# Patient Record
Sex: MALE | Race: WHITE | Employment: UNEMPLOYED | URBAN - METROPOLITAN AREA
[De-identification: names, ages, dates, MRNs, and addresses within clinical notes are randomized per-mention and may not be internally consistent; named-entity substitution may affect disease eponyms.]

---

## 2017-05-31 ENCOUNTER — HOSPITAL ENCOUNTER (EMERGENCY)
Age: 72
Discharge: HOME | End: 2017-05-31
Attending: EMERGENCY MEDICINE
Payer: MEDICARE

## 2017-05-31 VITALS
RESPIRATION RATE: 18 BRPM | SYSTOLIC BLOOD PRESSURE: 140 MMHG | BODY MASS INDEX: 38.77 KG/M2 | TEMPERATURE: 98.1 F | OXYGEN SATURATION: 98 % | DIASTOLIC BLOOD PRESSURE: 82 MMHG | HEART RATE: 77 BPM | WEIGHT: 247 LBS | HEIGHT: 67 IN

## 2017-05-31 DIAGNOSIS — B35.1 ONYCHOMYCOSIS: ICD-10-CM

## 2017-05-31 DIAGNOSIS — M10.9 ACUTE GOUT INVOLVING TOE OF LEFT FOOT, UNSPECIFIED CAUSE: Primary | ICD-10-CM

## 2017-05-31 PROCEDURE — 99283 EMERGENCY DEPT VISIT LOW MDM: CPT | Performed by: EMERGENCY MEDICINE

## 2017-05-31 RX ORDER — TERBINAFINE HYDROCHLORIDE 250 MG/1
250 TABLET ORAL DAILY
Qty: 30 TAB | Refills: 0 | Status: SHIPPED | OUTPATIENT
Start: 2017-05-31

## 2017-05-31 RX ORDER — LISINOPRIL 5 MG/1
TABLET ORAL
COMMUNITY
End: 2021-07-12

## 2017-05-31 RX ORDER — TRAMADOL HYDROCHLORIDE 50 MG/1
50 TABLET ORAL
Status: DISCONTINUED | OUTPATIENT
Start: 2017-05-31 | End: 2017-05-31 | Stop reason: HOSPADM

## 2017-05-31 RX ORDER — METOPROLOL TARTRATE 50 MG/1
TABLET ORAL
COMMUNITY

## 2017-05-31 RX ORDER — DICLOFENAC SODIUM 30 MG/G
GEL TOPICAL 2 TIMES DAILY
Qty: 100 G | Refills: 0 | Status: SHIPPED | OUTPATIENT
Start: 2017-05-31

## 2017-05-31 RX ORDER — COLCHICINE 0.6 MG/1
0.6 CAPSULE ORAL DAILY
Qty: 10 CAP | Refills: 0 | Status: SHIPPED | OUTPATIENT
Start: 2017-05-31

## 2017-06-01 NOTE — ED PROVIDER NOTES
HPI Comments: Patient presents referred from complaining of pain in the left great toe as well as a deformed toenail. He states he's had a similar  deformation of the toenail on the right foot and the toenail falls off from time to time. the pain in the left great toe is at the metatarsal phalangeal joint and was present when he awoke this morning he denies any history of trauma he denies any prior occurrence    Patient is a 67 y.o. male presenting with foot pain. The history is provided by the patient. Foot Pain    This is a new problem. The current episode started 12 to 24 hours ago. The problem occurs constantly. The problem has not changed since onset. The pain is present in the left foot. The quality of the pain is described as aching. The pain is at a severity of 9/10. The pain is severe. Pertinent negatives include no numbness, full range of motion, no stiffness and no tingling. The symptoms are aggravated by activity, standing and contact. He has tried nothing for the symptoms. The treatment provided no relief. There has been no history of extremity trauma. Past Medical History:   Diagnosis Date    Chronic obstructive pulmonary disease ([facility] [address]     Hypertension        History reviewed. No pertinent surgical history. History reviewed. No pertinent family history. Social History     Social History    Marital status: SINGLE     Spouse name: N/A    Number of children: N/A    Years of education: N/A     Occupational History    Not on file. Social History Main Topics    Smoking status: Current Every Day Smoker     Packs/day: 1.00    Smokeless tobacco: Not on file    Alcohol use Not on file    Drug use: Not on file    Sexual activity: Not on file     Other Topics Concern    Not on file     Social History Narrative    No narrative on file         ALLERGIES: Codeine and Pcn [penicillins]    Review of Systems   Musculoskeletal: Negative for stiffness.    Neurological: Negative for tingling and numbness. All other systems reviewed and are negative. Vitals:    05/31/17 1929   BP: 133/77   Pulse: 82   Resp: 20   Temp: 97.8 °F (36.6 °C)   SpO2: 96%   Weight: 112 kg (247 lb)   Height: 5' 7\" (1.702 m)            Physical Exam   Constitutional: He appears well-developed and well-nourished. No distress. HENT:   Head: Normocephalic and atraumatic. Eyes: Conjunctivae and EOM are normal. Pupils are equal, round, and reactive to light. Musculoskeletal: Normal range of motion. He exhibits tenderness. He exhibits no edema. Erythema and tenderness is noted to the metatarsophalangeal joint of the left foot of the great toe. There are also large deformed toenail with findings consistent with onychomycosis   Neurological: He is alert. Skin: Skin is warm and dry. There is erythema. Psychiatric: He has a normal mood and affect. His behavior is normal.   Nursing note and vitals reviewed.        MDM  Number of Diagnoses or Management Options  Acute gout involving toe of left foot, unspecified cause:   Onychomycosis:   Risk of Complications, Morbidity, and/or Mortality  Presenting problems: low  Diagnostic procedures: minimal  Management options: low    Patient Progress  Patient progress: stable    ED Course       Procedures

## 2017-06-01 NOTE — ED NOTES
Patient refused pain medication. States that he \"does not believe in it. \" Patient given ice pack to put on on affected toe. Patient holding bag in hand.

## 2021-07-12 ENCOUNTER — HOSPITAL ENCOUNTER (EMERGENCY)
Age: 76
Discharge: HOME | End: 2021-07-12
Attending: EMERGENCY MEDICINE
Payer: MEDICARE

## 2021-07-12 VITALS
RESPIRATION RATE: 17 BRPM | DIASTOLIC BLOOD PRESSURE: 80 MMHG | HEART RATE: 85 BPM | TEMPERATURE: 99 F | SYSTOLIC BLOOD PRESSURE: 182 MMHG | OXYGEN SATURATION: 94 %

## 2021-07-12 DIAGNOSIS — T78.3XXA ANGIOEDEMA, INITIAL ENCOUNTER: Primary | ICD-10-CM

## 2021-07-12 LAB
ABO + RH BLD: NORMAL
ALBUMIN SERPL-MCNC: 3.5 G/DL (ref 3.2–4.6)
ALBUMIN/GLOB SERPL: 0.9 {RATIO} (ref 1.2–3.5)
ALP SERPL-CCNC: 94 U/L (ref 50–136)
ALT SERPL-CCNC: 17 U/L (ref 12–65)
ANION GAP SERPL CALC-SCNC: 1 MMOL/L (ref 7–16)
AST SERPL-CCNC: 12 U/L (ref 15–37)
BASOPHILS # BLD: 0.1 K/UL (ref 0–0.2)
BASOPHILS NFR BLD: 1 % (ref 0–2)
BILIRUB SERPL-MCNC: 0.2 MG/DL (ref 0.2–1.1)
BLOOD GROUP ANTIBODIES SERPL: NORMAL
BUN SERPL-MCNC: 16 MG/DL (ref 8–23)
CALCIUM SERPL-MCNC: 9.4 MG/DL (ref 8.3–10.4)
CHLORIDE SERPL-SCNC: 109 MMOL/L (ref 98–107)
CO2 SERPL-SCNC: 31 MMOL/L (ref 21–32)
CREAT SERPL-MCNC: 1.04 MG/DL (ref 0.8–1.5)
DIFFERENTIAL METHOD BLD: ABNORMAL
EOSINOPHIL # BLD: 0.5 K/UL (ref 0–0.8)
EOSINOPHIL NFR BLD: 6 % (ref 0.5–7.8)
ERYTHROCYTE [DISTWIDTH] IN BLOOD BY AUTOMATED COUNT: 16.3 % (ref 11.9–14.6)
GLOBULIN SER CALC-MCNC: 4 G/DL (ref 2.3–3.5)
GLUCOSE SERPL-MCNC: 87 MG/DL (ref 65–100)
HCT VFR BLD AUTO: 46.6 % (ref 41.1–50.3)
HGB BLD-MCNC: 14.6 G/DL (ref 13.6–17.2)
IMM GRANULOCYTES # BLD AUTO: 0.1 K/UL (ref 0–0.5)
IMM GRANULOCYTES NFR BLD AUTO: 1 % (ref 0–5)
LYMPHOCYTES # BLD: 2.2 K/UL (ref 0.5–4.6)
LYMPHOCYTES NFR BLD: 29 % (ref 13–44)
MCH RBC QN AUTO: 28.3 PG (ref 26.1–32.9)
MCHC RBC AUTO-ENTMCNC: 31.3 G/DL (ref 31.4–35)
MCV RBC AUTO: 90.5 FL (ref 79.6–97.8)
MONOCYTES # BLD: 0.7 K/UL (ref 0.1–1.3)
MONOCYTES NFR BLD: 9 % (ref 4–12)
NEUTS SEG # BLD: 4.1 K/UL (ref 1.7–8.2)
NEUTS SEG NFR BLD: 53 % (ref 43–78)
NRBC # BLD: 0 K/UL (ref 0–0.2)
PLATELET # BLD AUTO: 252 K/UL (ref 150–450)
PMV BLD AUTO: 10.7 FL (ref 9.4–12.3)
POTASSIUM SERPL-SCNC: 4.3 MMOL/L (ref 3.5–5.1)
PROT SERPL-MCNC: 7.5 G/DL (ref 6.3–8.2)
RBC # BLD AUTO: 5.15 M/UL (ref 4.23–5.6)
SODIUM SERPL-SCNC: 141 MMOL/L (ref 136–145)
SPECIMEN EXP DATE BLD: NORMAL
WBC # BLD AUTO: 7.7 K/UL (ref 4.3–11.1)

## 2021-07-12 PROCEDURE — 85025 COMPLETE CBC W/AUTO DIFF WBC: CPT

## 2021-07-12 PROCEDURE — 96375 TX/PRO/DX INJ NEW DRUG ADDON: CPT

## 2021-07-12 PROCEDURE — 74011250636 HC RX REV CODE- 250/636: Performed by: EMERGENCY MEDICINE

## 2021-07-12 PROCEDURE — 96374 THER/PROPH/DIAG INJ IV PUSH: CPT

## 2021-07-12 PROCEDURE — 80053 COMPREHEN METABOLIC PANEL: CPT

## 2021-07-12 PROCEDURE — 74011000250 HC RX REV CODE- 250: Performed by: EMERGENCY MEDICINE

## 2021-07-12 PROCEDURE — 99283 EMERGENCY DEPT VISIT LOW MDM: CPT

## 2021-07-12 PROCEDURE — 86901 BLOOD TYPING SEROLOGIC RH(D): CPT

## 2021-07-12 RX ORDER — DIPHENHYDRAMINE HYDROCHLORIDE 50 MG/ML
25 INJECTION, SOLUTION INTRAMUSCULAR; INTRAVENOUS
Status: COMPLETED | OUTPATIENT
Start: 2021-07-12 | End: 2021-07-12

## 2021-07-12 RX ORDER — SODIUM CHLORIDE 0.9 % (FLUSH) 0.9 %
5-40 SYRINGE (ML) INJECTION EVERY 8 HOURS
Status: DISCONTINUED | OUTPATIENT
Start: 2021-07-12 | End: 2021-07-12 | Stop reason: HOSPADM

## 2021-07-12 RX ORDER — DIPHENHYDRAMINE HCL 25 MG
50 CAPSULE ORAL
Qty: 60 CAPSULE | Refills: 0 | Status: SHIPPED | OUTPATIENT
Start: 2021-07-12 | End: 2021-07-22

## 2021-07-12 RX ORDER — PREDNISONE 10 MG/1
TABLET ORAL
Qty: 48 TABLET | Refills: 0 | Status: SHIPPED | OUTPATIENT
Start: 2021-07-12

## 2021-07-12 RX ORDER — FAMOTIDINE 20 MG/1
20 TABLET, FILM COATED ORAL 2 TIMES DAILY
Qty: 20 TABLET | Refills: 0 | Status: SHIPPED | OUTPATIENT
Start: 2021-07-12 | End: 2021-07-22

## 2021-07-12 RX ORDER — SODIUM CHLORIDE 0.9 % (FLUSH) 0.9 %
5-40 SYRINGE (ML) INJECTION AS NEEDED
Status: DISCONTINUED | OUTPATIENT
Start: 2021-07-12 | End: 2021-07-12 | Stop reason: HOSPADM

## 2021-07-12 RX ADMIN — FAMOTIDINE 20 MG: 10 INJECTION INTRAVENOUS at 16:24

## 2021-07-12 RX ADMIN — DIPHENHYDRAMINE HYDROCHLORIDE 25 MG: 50 INJECTION INTRAMUSCULAR; INTRAVENOUS at 16:24

## 2021-07-12 RX ADMIN — METHYLPREDNISOLONE SODIUM SUCCINATE 125 MG: 125 INJECTION, POWDER, FOR SOLUTION INTRAMUSCULAR; INTRAVENOUS at 16:24

## 2021-07-12 NOTE — ED PROVIDER NOTES
65-year-old male with history of hypertension COPD presents emerged department complaining of half hour to 45 minutes of tongue swelling and discomfort with difficulty swallowing and speaking. He denies any other paralysis or paresthesias, denies any new medications. He does take lisinopril. When his caregiver got there this afternoon to check on him, she found him like this and brought him into the ER. She sees him on Mondays Wednesdays and half of Friday. She has not seen in 3 days. The history is provided by the patient and a caregiver. Tongue Swelling   The incident occurred today. The incident occurred at home. Pertinent negatives include no nausea, no vomiting, no cough and no difficulty breathing. There were no sick contacts. He has received no recent medical care. Past Medical History:   Diagnosis Date    Chronic obstructive pulmonary disease ([facility] [address]     Hypertension        No past surgical history on file. No family history on file. Social History     Socioeconomic History    Marital status: SINGLE     Spouse name: Not on file    Number of children: Not on file    Years of education: Not on file    Highest education level: Not on file   Occupational History    Not on file   Tobacco Use    Smoking status: Current Every Day Smoker     Packs/day: 1.00   Substance and Sexual Activity    Alcohol use: Not on file    Drug use: Not on file    Sexual activity: Not on file   Other Topics Concern    Not on file   Social History Narrative    Not on file     Social Determinants of Health     Financial Resource Strain:     Difficulty of Paying Living Expenses:    Food Insecurity:     Worried About Running Out of Food in the Last Year:     [address] in the Last Year:    Transportation Needs:     Lack of Transportation (Medical):      Lack of Transportation (Non-Medical):    Physical Activity:     Days of Exercise per Week:     Minutes of Exercise per Session:    Stress:     Feeling of Stress :    Social Connections:     Frequency of Communication with Friends and Family:     Frequency of Social Gatherings with Friends and Family:     Attends [employer] Services:     Active Member of Clubs or Organizations:     Attends Club or Organization Meetings:     Marital Status:    Intimate Partner Violence:     Fear of Current or Ex-Partner:     Emotionally Abused:     Physically Abused:     Sexually Abused: ALLERGIES: Codeine and Pcn [penicillins]    Review of Systems   Unable to perform ROS: Patient nonverbal (Patient cannot speak secondary to tongue swelling)   Respiratory: Negative for cough. Gastrointestinal: Negative for nausea and vomiting. Vitals:    07/12/21 1614   BP: 118/63   Pulse: 85   Resp: 17   Temp: 99 °F (37.2 °C)   SpO2: 98%            Physical Exam  Vitals and nursing note reviewed. Constitutional:       General: He is in acute distress (Mild). Appearance: He is well-developed. HENT:      Head: Normocephalic and atraumatic. Right Ear: External ear normal.      Left Ear: External ear normal.      Mouth/Throat:      Mouth: Mucous membranes are moist.      Comments: Patient's tongue is moderately swollen making his speech extremely difficult to understand, able to move air without difficulty. Eyes:      Extraocular Movements: Extraocular movements intact. Conjunctiva/sclera: Conjunctivae normal.      Pupils: Pupils are equal, round, and reactive to light. Cardiovascular:      Rate and Rhythm: Normal rate and regular rhythm. Heart sounds: Normal heart sounds. No murmur heard. Pulmonary:      Effort: Pulmonary effort is normal.      Breath sounds: Normal breath sounds. No wheezing, rhonchi or rales. Abdominal:      General: Bowel sounds are normal.      Palpations: Abdomen is soft. Tenderness: There is no abdominal tenderness. Musculoskeletal:         General: Normal range of motion.       Cervical back: Normal range of motion and neck supple. Right lower leg: Edema (2+ bilat, unchanged per patient) present. Left lower leg: Edema present. Skin:     General: Skin is warm and dry. Capillary Refill: Capillary refill takes less than 2 seconds. Neurological:      General: No focal deficit present. Mental Status: He is alert and oriented to person, place, and time. Psychiatric:         Mood and Affect: Mood normal.         Behavior: Behavior normal.          MDM  Number of Diagnoses or Management Options  Angioedema, initial encounter: new and requires workup     Amount and/or Complexity of Data Reviewed  Clinical lab tests: reviewed and ordered  Review and summarize past medical records: yes    Risk of Complications, Morbidity, and/or Mortality  Presenting problems: high  Diagnostic procedures: minimal  Management options: moderate    Patient Progress  Patient progress: improved    ED Course as of Jul 12 1818   Mon Jul 12, 2021 1739 Doing slowly decreasing in swelling. Able to understand some of the patient's words now, and states he is feeling better. He believes the swelling is secondary to him spraying Odoban in the bathroom. I told him although this could be some sort of causative agent, it is much more common for this to happen with lisinopril and he needs to stop the lisinopril. We will continue to observe the patient for another hour, if continuing to improve we will plan to discharge home on steroids, Benadryl, and Pepcid. [BB]      ED Course User Index  [BB] Jet Webb MD       Procedures      The patient was observed in the ED. symptoms slowly improved throughout his hospital stay. Patient be instructed to avoid lisinopril as well as his Odo ban spray for the bathroom.     Results Reviewed:      Recent Results (from the past 24 hour(s))   CBC WITH AUTOMATED DIFF    Collection Time: 07/12/21  4:19 PM   Result Value Ref Range    WBC 7.7 4.3 - 11.1 K/uL    RBC 5.15 4.23 - 5.6 M/uL HGB 14.6 13.6 - 17.2 g/dL    HCT 46.6 41.1 - 50.3 %    MCV 90.5 79.6 - 97.8 FL    MCH 28.3 26.1 - 32.9 PG    MCHC 31.3 (L) 31.4 - 35.0 g/dL    RDW 16.3 (H) 11.9 - 14.6 %    PLATELET 335 695 - 067 K/uL    MPV 10.7 9.4 - 12.3 FL    ABSOLUTE NRBC 0.00 0.0 - 0.2 K/uL    DF AUTOMATED      NEUTROPHILS 53 43 - 78 %    LYMPHOCYTES 29 13 - 44 %    MONOCYTES 9 4.0 - 12.0 %    EOSINOPHILS 6 0.5 - 7.8 %    BASOPHILS 1 0.0 - 2.0 %    IMMATURE GRANULOCYTES 1 0.0 - 5.0 %    ABS. NEUTROPHILS 4.1 1.7 - 8.2 K/UL    ABS. LYMPHOCYTES 2.2 0.5 - 4.6 K/UL    ABS. MONOCYTES 0.7 0.1 - 1.3 K/UL    ABS. EOSINOPHILS 0.5 0.0 - 0.8 K/UL    ABS. BASOPHILS 0.1 0.0 - 0.2 K/UL    ABS. IMM. GRANS. 0.1 0.0 - 0.5 K/UL   METABOLIC PANEL, COMPREHENSIVE    Collection Time: 07/12/21  4:19 PM   Result Value Ref Range    Sodium 141 136 - 145 mmol/L    Potassium 4.3 3.5 - 5.1 mmol/L    Chloride 109 (H) 98 - 107 mmol/L    CO2 31 21 - 32 mmol/L    Anion gap 1 (L) 7 - 16 mmol/L    Glucose 87 65 - 100 mg/dL    BUN 16 8 - 23 MG/DL    Creatinine 1.04 0.8 - 1.5 MG/DL    GFR est AA >60 >60 ml/min/1.73m2    GFR est non-AA >60 >60 ml/min/1.73m2    Calcium 9.4 8.3 - 10.4 MG/DL    Bilirubin, total 0.2 0.2 - 1.1 MG/DL    ALT (SGPT) 17 12 - 65 U/L    AST (SGOT) 12 (L) 15 - 37 U/L    Alk. phosphatase 94 50 - 136 U/L    Protein, total 7.5 6.3 - 8.2 g/dL    Albumin 3.5 3.2 - 4.6 g/dL    Globulin 4.0 (H) 2.3 - 3.5 g/dL    A-G Ratio 0.9 (L) 1.2 - 3.5     TYPE & SCREEN    Collection Time: 07/12/21  4:19 PM   Result Value Ref Range    Crossmatch Expiration 07/15/2021,235     ABO/Rh(D) A POSITIVE     Antibody screen NEG        I discussed the results of all labs, procedures, radiographs, and treatments with the patient and available family. Treatment plan is agreed upon and the patient is ready for discharge. All voiced understanding of the discharge plan and medication instructions or changes as appropriate. Questions about treatment in the ED were answered.   All were encouraged to return should symptoms worsen or new problems develop.

## 2021-07-12 NOTE — ED TRIAGE NOTES
Pt arrives via POV with slurred speech at an unknown time. Pt lives by himself. Presents with tongue swelling and reports taking lisinopril. Pt denies trouble breathing.

## 2021-07-12 NOTE — ED NOTES
I have reviewed discharge instructions with the patient. The patient verbalized understanding. Patient left ED via Discharge Method: ambulatory to Home with (caregiver. Opportunity for questions and clarification provided. Patient given 3 scripts. Pt refused discharge vitals      To continue your aftercare when you leave the hospital, you may receive an automated call from our care team to check in on how you are doing. This is a free service and part of our promise to provide the best care and service to meet your aftercare needs.  If you have questions, or wish to unsubscribe from this service please call [phone]. Thank you for Choosing our [address] [place] [address] Emergency Department.